# Patient Record
Sex: FEMALE | Race: WHITE | Employment: FULL TIME | URBAN - METROPOLITAN AREA
[De-identification: names, ages, dates, MRNs, and addresses within clinical notes are randomized per-mention and may not be internally consistent; named-entity substitution may affect disease eponyms.]

---

## 2018-08-03 ENCOUNTER — OFFICE VISIT (OUTPATIENT)
Dept: OTOLARYNGOLOGY | Facility: CLINIC | Age: 18
End: 2018-08-03
Payer: COMMERCIAL

## 2018-08-03 ENCOUNTER — OFFICE VISIT (OUTPATIENT)
Dept: AUDIOLOGY | Facility: CLINIC | Age: 18
End: 2018-08-03
Payer: COMMERCIAL

## 2018-08-03 VITALS
WEIGHT: 110 LBS | SYSTOLIC BLOOD PRESSURE: 140 MMHG | DIASTOLIC BLOOD PRESSURE: 98 MMHG | HEIGHT: 63 IN | RESPIRATION RATE: 12 BRPM | HEART RATE: 99 BPM | BODY MASS INDEX: 19.49 KG/M2 | OXYGEN SATURATION: 100 %

## 2018-08-03 DIAGNOSIS — H60.63 CHRONIC NON-INFECTIVE OTITIS EXTERNA OF BOTH EARS, UNSPECIFIED TYPE: Primary | ICD-10-CM

## 2018-08-03 DIAGNOSIS — H90.12 CONDUCTIVE HEARING LOSS OF LEFT EAR WITH UNRESTRICTED HEARING OF RIGHT EAR: Primary | ICD-10-CM

## 2018-08-03 DIAGNOSIS — H69.93 DISORDER OF BOTH EUSTACHIAN TUBES: ICD-10-CM

## 2018-08-03 PROCEDURE — 92567 TYMPANOMETRY: CPT | Performed by: AUDIOLOGIST

## 2018-08-03 PROCEDURE — 99204 OFFICE O/P NEW MOD 45 MIN: CPT | Performed by: OTOLARYNGOLOGY

## 2018-08-03 PROCEDURE — 99207 ZZC NO CHARGE LOS: CPT | Performed by: AUDIOLOGIST

## 2018-08-03 PROCEDURE — 92557 COMPREHENSIVE HEARING TEST: CPT | Performed by: AUDIOLOGIST

## 2018-08-03 RX ORDER — NEOMYCIN SULFATE, POLYMYXIN B SULFATE AND HYDROCORTISONE 10; 3.5; 1 MG/ML; MG/ML; [USP'U]/ML
4 SUSPENSION/ DROPS AURICULAR (OTIC) 2 TIMES DAILY
Qty: 9 ML | Refills: 11 | Status: SHIPPED | OUTPATIENT
Start: 2018-08-03 | End: 2018-08-24

## 2018-08-03 NOTE — MR AVS SNAPSHOT
After Visit Summary   8/3/2018    Brook Davison    MRN: [id number]           Patient Information     Date Of Birth          2000        Visit Information        Provider Department      8/3/2018 8:30 AM Faizan Zuluaga [facility]        Today's Diagnoses     Conductive hearing loss of left ear with unrestricted hearing of right ear    -  1    Disorder of both eustachian tubes           Follow-ups after your visit        Who to contact     If you have questions or need follow up information about today's clinic visit or your schedule please contact [facility] directly at [phone].  Normal or non-critical lab and imaging results will be communicated to you by MyChart, letter or phone within 4 business days after the clinic has received the results. If you do not hear from us within 7 days, please contact the clinic through MyChart or phone. If you have a critical or abnormal lab result, we will notify you by phone as soon as possible.  Submit refill requests through [employer] or call your pharmacy and they will forward the refill request to us. Please allow 3 business days for your refill to be completed.          Additional Information About Your Visit        Care EveryWhere ID     This is your Care EveryWhere ID. This could be used by other organizations to access your [facility] medical records  [id number]         Blood Pressure from Last 3 Encounters:   No data found for BP    Weight from Last 3 Encounters:   No data found for Wt              We Performed the Following     AUDIOGRAM/TYMPANOGRAM - INTERFACE     COMPREHENSIVE HEARING TEST     TYMPANOMETRY        Primary Care Provider Office Phone # Fax #    [facility] [phone]       [address] [facility] [address]        Equal Access to Services     PEPE EDOUARD AH: Brisa randallo Soomaali, waaxda luqadaha, qaybta kaalmaranda mendoza  wero montemayor ah. So [facility] [phone].    ATENCIÓN: Si habla celio, tiene a milian disposición servicios gratuitos de asistencia lingüística. Solo al [phone].    We comply with applicable federal civil rights laws and [place] laws. We do not discriminate on the basis of race, color, national origin, age, disability, sex, sexual orientation, or gender identity.            Thank you!     Thank you for choosing [facility]  for your care. Our goal is always to provide you with excellent care. Hearing back from our patients is one way we can continue to improve our services. Please take a few minutes to complete the written survey that you may receive in the mail after your visit with us. Thank you!             Your Updated Medication List - Protect others around you: Learn how to safely use, store and throw away your medicines at www.disposemymeds.org.      Notice  As of 8/3/2018  9:05 AM    You have not been prescribed any medications.

## 2018-08-03 NOTE — LETTER
"    8/3/2018         RE: Brook Davison  [address] [place] [address]        Dear Colleague,    Thank you for referring your patient, Brook Davison, to the [facility]. Please see a copy of my visit note below.    History of Present Illness - Brook Davison is a 18 year old female here to see me for the first time for ear issues.  She tells me that when she was a child she had chronic ear infections, and had at least one set of tubes.  After extrusion, through her older childhood, she decreased the frequency to about 3-4 times per year.    Then in the past year things seem to have ramped.  She will get an acute sharp pain, and then there will be drainage from the ears.  The RIGHT is much worse with \"horrible smelling drainage.\"  But interestingly the LEFT ear has more pain, but does not really drain.   Other than the tubes, no ENT surgeries.  She has not had any change in environment and no change in overall nasal health.  She does not have a lot of water exposure.  She avoids qtips, and she does use debrox at times.    She does have food allergies but does not see an allergist regularly.  She actually lives in [place], [place], but is here for college.    Past Medical History - There is no problem list on file for this patient.      Current Medications -   Current Outpatient Prescriptions:      neomycin-polymyxin-hydrocortisone (CORTISPORIN) 3.5-36740-5 otic suspension, Place 4 drops into both ears 2 times daily for 21 days, Disp: 9 mL, Rfl: 11    Allergies - No Known Allergies    Social History -   Social History     Social History     Marital status: Single     Spouse name: N/A     Number of children: N/A     Years of education: N/A     Social History Main Topics     Smoking status: Not on file     Smokeless tobacco: Not on file     Alcohol use Not on file     Drug use: Not on file     Sexual activity: Not on file     Other Topics Concern     Not on file     Social History Narrative " "      Family History - No family history on file.    Review of Systems - As per HPI and PMHx, otherwise 10+ system review of the head and neck, and general constitution is negative.    Physical Exam  BP (!) 140/98  Pulse 99  Resp 12  Ht 1.6 m (5' 3\")  Wt 49.9 kg (110 lb)  SpO2 100%  BMI 19.49 kg/m2    General - The patient is well nourished and well developed, and appears to have good nutritional status.  Alert and oriented to person and place, answers questions and cooperates with examination appropriately.   Head and Face - Normocephalic and atraumatic, with no gross asymmetry noted of the contour of the facial features.  The facial nerve is intact, with strong symmetric movements.  Voice and Breathing - The patient was breathing comfortably without the use of accessory muscles. There was no wheezing, stridor, or stertor.  The patients voice was clear and strong, and had appropriate pitch and quality.  Ears - The tympanic membranes are normal in appearance, bony landmarks are intact.  No retraction, perforation, or masses.  No fluid or purulence was seen in the external canal or the middle ear. No evidence of infection of the middle ear.  But bilaterally the canals are very moist, especially the RIGHT with purulent debris.  But the tympanic membrane's look healthy.  Eyes - Extraocular movements intact, and the pupils were reactive to light.  Sclera were not icteric or injected, conjunctiva were pink and moist.  Mouth - Examination of the oral cavity showed pink, healthy oral mucosa. No lesions or ulcerations noted.  The tongue was mobile and midline, and the dentition were in good condition.    Throat - The walls of the oropharynx were smooth, pink, moist, symmetric, and had no lesions or ulcerations.  The tonsillar pillars and soft palate were symmetric.  The uvula was midline on elevation.    Neck - Normal midline excursion of the laryngotracheal complex during swallowing.  Full range of motion on passive " movement.  Palpation of the occipital, submental, submandibular, internal jugular chain, and supraclavicular nodes did not demonstrate any abnormal lymph nodes or masses.  The carotid pulse was palpable bilaterally.  Palpation of the thyroid was soft and smooth, with no nodules or goiter appreciated.  The trachea was mobile and midline.  Nose - External contour is symmetric, no gross deflection or scars.  Nasal mucosa is pink and moist with no abnormal mucus.  The septum was midline and non-obstructive, turbinates of normal size and position.  No polyps, masses, or purulence noted on examination.    Audiologic Studies - An audiogram and tympanogram were performed today as part of the evaluation and personally reviewed. The tympanogram shows a normal Type A curve, with normal canal volume and middle ear pressure.  There is no sign of eustachian tube dysfunction or middle ear effusion.  The audiogram was also normal.  The sensorineural hearing was age-appropriate, with no evidence of conductive hearing loss or significant asymmetry.      A/P - Brook Davison is a 18 year old female  (H60.63) Chronic non-infective otitis externa of both ears, unspecified type  (primary encounter diagnosis)    I am happy to tell her that it is not recurrent tympanic membrane rupture, but chronic otitis externa that is the issues.  Based on the normal audio and tympanogram today, I do not think that eustachian tube dysfunction is the issue, and tubes are not indicated at this time.    To address the otitis externa, I will place her on cortisporin drops for 21 days.  And then after that, use as needed.  If that does not control it, then follow up with me.    Again, thank you for allowing me to participate in the care of your patient.        Sincerely,        Ricco Damon MD

## 2018-08-03 NOTE — PATIENT INSTRUCTIONS
Scheduling Information  To schedule your CT/MRI scan, please contact [facility] at [phone] OR [facility] at [phone]    To schedule your Surgery, please contact our Specialty Schedulers at [phone]      ENT Clinic Locations Clinic Hours Telephone Number     [facility]  [address] [place] [address]   Monday:           1:00pm -- 5:00pm    Friday:              8:00am - 12:00pm   To schedule/reschedule an appointment with   Dr. Damon,   please contact our   Specialty Scheduling Department at:     [phone]       [facility]  [address] [place] [address] Tuesday:          8:00am -- 2:00pm         Urgent Care Locations Clinic Hours Telephone Numbers     [facility]  [address] [place] [address]     Monday-Friday:     11:00am - 9:00pm    Saturday-Sunday:  9:00am - 5:00pm   [phone]     [facility]  [address] [place] [address]     Monday-Friday:      5:00pm - 9:00pm     Saturday-Sunday:  9:00am - 5:00pm   [phone]

## 2018-08-03 NOTE — PROGRESS NOTES
"History of Present Illness - Brook Davison is a 18 year old female here to see me for the first time for ear issues.  She tells me that when she was a child she had chronic ear infections, and had at least one set of tubes.  After extrusion, through her older childhood, she decreased the frequency to about 3-4 times per year.    Then in the past year things seem to have ramped.  She will get an acute sharp pain, and then there will be drainage from the ears.  The RIGHT is much worse with \"horrible smelling drainage.\"  But interestingly the LEFT ear has more pain, but does not really drain.   Other than the tubes, no ENT surgeries.  She has not had any change in environment and no change in overall nasal health.  She does not have a lot of water exposure.  She avoids qtips, and she does use debrox at times.    She does have food allergies but does not see an allergist regularly.  She actually lives in [place], [place], but is here for college.    Past Medical History - There is no problem list on file for this patient.      Current Medications -   Current Outpatient Prescriptions:      neomycin-polymyxin-hydrocortisone (CORTISPORIN) 3.5-93502-5 otic suspension, Place 4 drops into both ears 2 times daily for 21 days, Disp: 9 mL, Rfl: 11    Allergies - No Known Allergies    Social History -   Social History     Social History     Marital status: Single     Spouse name: N/A     Number of children: N/A     Years of education: N/A     Social History Main Topics     Smoking status: Not on file     Smokeless tobacco: Not on file     Alcohol use Not on file     Drug use: Not on file     Sexual activity: Not on file     Other Topics Concern     Not on file     Social History Narrative       Family History - No family history on file.    Review of Systems - As per HPI and PMHx, otherwise 10+ system review of the head and neck, and general constitution is negative.    Physical Exam  BP (!) 140/98  Pulse 99  Resp 12  Ht 1.6 m (5' " "3\")  Wt 49.9 kg (110 lb)  SpO2 100%  BMI 19.49 kg/m2    General - The patient is well nourished and well developed, and appears to have good nutritional status.  Alert and oriented to person and place, answers questions and cooperates with examination appropriately.   Head and Face - Normocephalic and atraumatic, with no gross asymmetry noted of the contour of the facial features.  The facial nerve is intact, with strong symmetric movements.  Voice and Breathing - The patient was breathing comfortably without the use of accessory muscles. There was no wheezing, stridor, or stertor.  The patients voice was clear and strong, and had appropriate pitch and quality.  Ears - The tympanic membranes are normal in appearance, bony landmarks are intact.  No retraction, perforation, or masses.  No fluid or purulence was seen in the external canal or the middle ear. No evidence of infection of the middle ear.  But bilaterally the canals are very moist, especially the RIGHT with purulent debris.  But the tympanic membrane's look healthy.  Eyes - Extraocular movements intact, and the pupils were reactive to light.  Sclera were not icteric or injected, conjunctiva were pink and moist.  Mouth - Examination of the oral cavity showed pink, healthy oral mucosa. No lesions or ulcerations noted.  The tongue was mobile and midline, and the dentition were in good condition.    Throat - The walls of the oropharynx were smooth, pink, moist, symmetric, and had no lesions or ulcerations.  The tonsillar pillars and soft palate were symmetric.  The uvula was midline on elevation.    Neck - Normal midline excursion of the laryngotracheal complex during swallowing.  Full range of motion on passive movement.  Palpation of the occipital, submental, submandibular, internal jugular chain, and supraclavicular nodes did not demonstrate any abnormal lymph nodes or masses.  The carotid pulse was palpable bilaterally.  Palpation of the thyroid was soft " and smooth, with no nodules or goiter appreciated.  The trachea was mobile and midline.  Nose - External contour is symmetric, no gross deflection or scars.  Nasal mucosa is pink and moist with no abnormal mucus.  The septum was midline and non-obstructive, turbinates of normal size and position.  No polyps, masses, or purulence noted on examination.    Audiologic Studies - An audiogram and tympanogram were performed today as part of the evaluation and personally reviewed. The tympanogram shows a normal Type A curve, with normal canal volume and middle ear pressure.  There is no sign of eustachian tube dysfunction or middle ear effusion.  The audiogram was also normal.  The sensorineural hearing was age-appropriate, with no evidence of conductive hearing loss or significant asymmetry.      A/P - Brook Davison is a 18 year old female  (H60.63) Chronic non-infective otitis externa of both ears, unspecified type  (primary encounter diagnosis)    I am happy to tell her that it is not recurrent tympanic membrane rupture, but chronic otitis externa that is the issues.  Based on the normal audio and tympanogram today, I do not think that eustachian tube dysfunction is the issue, and tubes are not indicated at this time.    To address the otitis externa, I will place her on cortisporin drops for 21 days.  And then after that, use as needed.  If that does not control it, then follow up with me.

## 2018-08-03 NOTE — MR AVS SNAPSHOT
After Visit Summary   8/3/2018    Brook Davison    MRN: [id number]           Patient Information     Date Of Birth          2000        Visit Information        Provider Department      8/3/2018 9:00 AM Ricco Damon MD [facility]        Today's Diagnoses     Chronic non-infective otitis externa of both ears, unspecified type    -  1      Care Instructions    Scheduling Information  To schedule your CT/MRI scan, please contact [facility] at [phone] OR [facility] at [phone]    To schedule your Surgery, please contact our Specialty Schedulers at [phone]      ENT Clinic Locations Clinic Hours Telephone Number     [facility]  [address]  [place] [address]   Monday:           1:00pm -- 5:00pm    Friday:              8:00am - 12:00pm   To schedule/reschedule an appointment with   Dr. Damon,   please contact our   Specialty Scheduling Department at:     [phone]       [facility]  [address] [place] [address] Tuesday:          8:00am -- 2:00pm         Urgent Care Locations Clinic Hours Telephone Numbers     [facility]  [address] [place] [address]     Monday-Friday:     11:00am - 9:00pm    Saturday-Sunday:  9:00am - 5:00pm   [phone]     [facility]  [address] [place] [address]     Monday-Friday:      5:00pm - 9:00pm     Saturday-Sunday:  9:00am - 5:00pm   [phone]                 Follow-ups after your visit        Who to contact     If you have questions or need follow up information about today's clinic visit or your schedule please contact [facility] directly at [phone].  Normal or non-critical lab and imaging results will be communicated to you by MyChart, letter or phone within 4 business days after the clinic has received the results. If you do not hear from us within 7 days, please contact the clinic through MyChart or phone. If  "you have a critical or abnormal lab result, we will notify you by phone as soon as possible.  Submit refill requests through [employer] or call your pharmacy and they will forward the refill request to us. Please allow 3 business days for your refill to be completed.          Additional Information About Your Visit        Care EveryWhere ID     This is your Care EveryWhere ID. This could be used by other organizations to access your [facility] medical records  [id number]        Your Vitals Were     Pulse Respirations Height Pulse Oximetry BMI (Body Mass Index)       99 12 1.6 m (5' 3\") 100% 19.49 kg/m2        Blood Pressure from Last 3 Encounters:   08/03/18 (!) 140/98    Weight from Last 3 Encounters:   08/03/18 49.9 kg (110 lb)              Today, you had the following     No orders found for display         Today's Medication Changes          These changes are accurate as of 8/3/18  9:48 AM.  If you have any questions, ask your nurse or doctor.               Start taking these medicines.        Dose/Directions    neomycin-polymyxin-hydrocortisone 3.5-33657-5 otic suspension   Commonly known as:  CORTISPORIN   Used for:  Chronic non-infective otitis externa of both ears, unspecified type   Started by:  Ricco Damon MD        Dose:  4 drop   Place 4 drops into both ears 2 times daily for 21 days   Quantity:  9 mL   Refills:  11            Where to get your medicines      These medications were sent to [id number] - [place], [place] - [address] AT [facility] [address]  [address], [place] [address]    Hours:  24-hours Phone:  [phone]     neomycin-polymyxin-hydrocortisone 3.5-54166-3 otic suspension                Primary Care Provider Office Phone # Fax #    [facility] [phone] [address] [facility] [address]        Equal Access to Services     PEPE EDOUARD AH: Brisa Barnett, waaxda luqadaha, qaybta " maranda brownmadelyn montemayor ah. Roseann [facility] [phone].    ATENCIÓN: Si keon pickens, tiene a milian disposición servicios gratuitos de asistencia lingüística. Solo al [phone].    We comply with applicable federal civil rights laws and [place] laws. We do not discriminate on the basis of race, color, national origin, age, disability, sex, sexual orientation, or gender identity.            Thank you!     Thank you for choosing [facility]  for your care. Our goal is always to provide you with excellent care. Hearing back from our patients is one way we can continue to improve our services. Please take a few minutes to complete the written survey that you may receive in the mail after your visit with us. Thank you!             Your Updated Medication List - Protect others around you: Learn how to safely use, store and throw away your medicines at www.disposemymeds.org.          This list is accurate as of 8/3/18  9:48 AM.  Always use your most recent med list.                   Brand Name Dispense Instructions for use Diagnosis    neomycin-polymyxin-hydrocortisone 3.5-87213-0 otic suspension    CORTISPORIN    9 mL    Place 4 drops into both ears 2 times daily for 21 days    Chronic non-infective otitis externa of both ears, unspecified type

## 2018-08-03 NOTE — PROGRESS NOTES
AUDIOLOGY REPORT:    Patient was referred to Audiology from ENT by Dr. Damon for a hearing examination. Patient reports a significant history of eustachian tube dysfunction including PE tubes bilaterally. Patient reports recent ear infections which have caused ruptures to both tympanic membranes. Patient reports that she currently has a perforation in the right ear tympanic membrane.     Testing:    Otoscopy:   Otoscopic exam indicates partial obstruction with cerumen bilaterally     Tympanograms:    RIGHT: normal eardrum mobility     LEFT:   normal eardrum mobility    Thresholds:   Pure Tone Thresholds assessed using conventional audiometry with good  reliability from 250-8000 Hz bilaterally using insert earphones     RIGHT:  normal hearing sensitivity for all frequencies tested.     LEFT:    normal hearing sensitivity for all frequencies tested with the exception of a mild conductive hearing loss at 4000 Hz only    Speech Reception Threshold:    RIGHT: 10 dB HL    LEFT:   10 dB HL    Word Recognition Score:     RIGHT: 100% at 50 dB HL using NU-6 recorded word list.    LEFT:   100% at 50 dB HL using NU-6 recorded word list.    Discussed results with the patient.     Patient was returned to ENT for follow up.     Faizan Lucio CCC-A  Licensed Audiologist  8/3/2018

## 2018-08-05 ENCOUNTER — TELEPHONE (OUTPATIENT)
Dept: OTOLARYNGOLOGY | Facility: CLINIC | Age: 18
End: 2018-08-05

## 2018-08-05 NOTE — TELEPHONE ENCOUNTER
Clinic Action Needed:  Yes, please contact the pharmcy at [phone]    Presenting Problem: Pharmacist calling to have clinic provider consult with them regarding pt's history of perforated ear drum and the medication sent over on 8/3/18.      Routed to:  OLGA Mariee RN/FNA

## 2018-08-06 NOTE — TELEPHONE ENCOUNTER
Spoke with pharmacist and clarified that pt's most recent exam did not show sign of a perforated TM. They had no further questions.    Manpreet Cisneros RN....8/6/2018 8:18 AM

## 2018-08-23 ENCOUNTER — OFFICE VISIT (OUTPATIENT)
Dept: OTOLARYNGOLOGY | Facility: CLINIC | Age: 18
End: 2018-08-23
Payer: COMMERCIAL

## 2018-08-23 VITALS
WEIGHT: 115 LBS | DIASTOLIC BLOOD PRESSURE: 79 MMHG | TEMPERATURE: 98.7 F | HEART RATE: 79 BPM | BODY MASS INDEX: 20.38 KG/M2 | SYSTOLIC BLOOD PRESSURE: 114 MMHG | HEIGHT: 63 IN

## 2018-08-23 DIAGNOSIS — H60.392 INFECTIVE OTITIS EXTERNA, LEFT: Primary | ICD-10-CM

## 2018-08-23 PROCEDURE — 99213 OFFICE O/P EST LOW 20 MIN: CPT | Performed by: OTOLARYNGOLOGY

## 2018-08-23 ASSESSMENT — PAIN SCALES - GENERAL: PAINLEVEL: NO PAIN (0)

## 2018-08-23 NOTE — MR AVS SNAPSHOT
"              After Visit Summary   8/23/2018    Brook Davison    MRN: [id number]           Patient Information     Date Of Birth          2000        Visit Information        Provider Department      8/23/2018 1:45 PM Khadar Ambriz MD [facility]        Today's Diagnoses     Infective otitis externa, left    -  1      Care Instructions    Per Physician's instructions            Follow-ups after your visit        Who to contact     If you have questions or need follow up information about today's clinic visit or your schedule please contact [facility] directly at [phone].  Normal or non-critical lab and imaging results will be communicated to you by MyChart, letter or phone within 4 business days after the clinic has received the results. If you do not hear from us within 7 days, please contact the clinic through MyChart or phone. If you have a critical or abnormal lab result, we will notify you by phone as soon as possible.  Submit refill requests through [employer] or call your pharmacy and they will forward the refill request to us. Please allow 3 business days for your refill to be completed.          Additional Information About Your Visit        Care EveryWhere ID     This is your Care EveryWhere ID. This could be used by other organizations to access your [facility] medical records  [id number]        Your Vitals Were     Pulse Temperature Height BMI (Body Mass Index)          79 98.7  F (37.1  C) (Oral) 1.6 m (5' 3\") 20.37 kg/m2         Blood Pressure from Last 3 Encounters:   08/23/18 114/79   08/03/18 (!) 140/98    Weight from Last 3 Encounters:   08/23/18 52.2 kg (115 lb)   08/03/18 49.9 kg (110 lb)              Today, you had the following     No orders found for display       Primary Care Provider Office Phone # Fax #    [facility] [phone]       [address]  [place] [address]        Equal Access to Services     PEPE EDOUARD AH: " Hadii cheryle perez Soraniali, waaxda luqadaha, qaybta kaalwallace thomas, maranda gaylain hayaabandar tranmadelyn conteh layarelibandar je. So [facility] [phone].    ATENCIÓN: Si habla celio, tiene a milian disposición servicios gratuitos de asistencia lingüística. Llame al [phone].    We comply with applicable federal civil rights laws and [place] laws. We do not discriminate on the basis of race, color, national origin, age, disability, sex, sexual orientation, or gender identity.            Thank you!     Thank you for choosing [facility]  for your care. Our goal is always to provide you with excellent care. Hearing back from our patients is one way we can continue to improve our services. Please take a few minutes to complete the written survey that you may receive in the mail after your visit with us. Thank you!             Your Updated Medication List - Protect others around you: Learn how to safely use, store and throw away your medicines at www.disposemymeds.org.          This list is accurate as of 8/23/18 11:59 PM.  Always use your most recent med list.                   Brand Name Dispense Instructions for use Diagnosis    neomycin-polymyxin-hydrocortisone 3.5-67956-9 otic suspension    CORTISPORIN    9 mL    Place 4 drops into both ears 2 times daily for 21 days    Chronic non-infective otitis externa of both ears, unspecified type

## 2018-08-23 NOTE — NURSING NOTE
"Initial /79 (BP Location: Right arm, Patient Position: Sitting, Cuff Size: Adult Regular)  Pulse 79  Temp 98.7  F (37.1  C) (Oral)  Ht 1.6 m (5' 3\")  Wt 52.2 kg (115 lb)  BMI 20.37 kg/m2 Estimated body mass index is 20.37 kg/(m^2) as calculated from the following:    Height as of this encounter: 1.6 m (5' 3\").    Weight as of this encounter: 52.2 kg (115 lb). .    Flora Lino CMA    "

## 2018-08-23 NOTE — PROGRESS NOTES
"History of Present Illness - Brook Davison is a 18 year old female who recently saw Dr Damon for chronic otitis externa bilaterally. She was treated with cortisporin. She still thinks her ears are infected. Audiogram last time was normal.  She feels her left ear is still a bit full. She is flying tomorrow, and wanted to be sure everything healed well.    Past Medical History -   Bilateral myringotomy tubes in childhood    Current Medications -   Current Outpatient Prescriptions:      neomycin-polymyxin-hydrocortisone (CORTISPORIN) 3.5-75148-1 otic suspension, Place 4 drops into both ears 2 times daily for 21 days (Patient not taking: Reported on 8/23/2018), Disp: 9 mL, Rfl: 11    Allergies -   Allergies   Allergen Reactions     Amoxicillin GI Disturbance     headaches       Social History -   Social History     Social History     Marital status: Single     Spouse name: N/A     Number of children: N/A     Years of education: N/A     Social History Main Topics     Smoking status: Never Smoker     Smokeless tobacco: Never Used     Alcohol use Not on file     Drug use: Not on file     Sexual activity: Not on file     Other Topics Concern     Not on file     Social History Narrative     No narrative on file       Family History - History reviewed. No pertinent family history.    Review of Systems - As per HPI and PMHx, otherwise 7 system review of the head and neck negative. 10+ system review negative.    Exam:  /79 (BP Location: Right arm, Patient Position: Sitting, Cuff Size: Adult Regular)  Pulse 79  Temp 98.7  F (37.1  C) (Oral)  Ht 1.6 m (5' 3\")  Wt 52.2 kg (115 lb)  BMI 20.37 kg/m2  General - The patient is well nourished and well developed, and appears to have good nutritional status.  Alert and oriented to person and place, answers questions and cooperates with examination appropriately.   Head and Face - Normocephalic and atraumatic, with no gross asymmetry noted of the contour of the facial " features.  The facial nerve is intact, with strong symmetric movements.  Eyes - Extraocular movements intact.  Sclera were not icteric or injected, conjunctiva were pink and moist.  Ears - bilateral ear canals with resolution of otorrhea. There was some precipitate on the left TM, but she could not tolerate removal of this today.       A/P - Brook Davison is a 18 year old female with resolution of otitis externa. I reassured her that the treatment seems to have worked. There is no perforation of the TM, and she is perfectly fine to fly as planned.      Dr. Khadar Ambriz MD  Otolaryngology  [facility]

## 2018-08-23 NOTE — LETTER
"    8/23/2018         RE: Brook Davison  [address] [place] [address]        Dear Colleague,    Thank you for referring your patient, Brook Davison, to the [facility]. Please see a copy of my visit note below.    History of Present Illness - Brook Davison is a 18 year old female who recently saw Dr Damon for chronic otitis externa bilaterally. She was treated with cortisporin. She still thinks her ears are infected. Audiogram last time was normal.  She feels her left ear is still a bit full. She is flying tomorrow, and wanted to be sure everything healed well.    Past Medical History -   Bilateral myringotomy tubes in childhood    Current Medications -   Current Outpatient Prescriptions:      neomycin-polymyxin-hydrocortisone (CORTISPORIN) 3.5-06414-1 otic suspension, Place 4 drops into both ears 2 times daily for 21 days (Patient not taking: Reported on 8/23/2018), Disp: 9 mL, Rfl: 11    Allergies -   Allergies   Allergen Reactions     Amoxicillin GI Disturbance     headaches       Social History -   Social History     Social History     Marital status: Single     Spouse name: N/A     Number of children: N/A     Years of education: N/A     Social History Main Topics     Smoking status: Never Smoker     Smokeless tobacco: Never Used     Alcohol use Not on file     Drug use: Not on file     Sexual activity: Not on file     Other Topics Concern     Not on file     Social History Narrative     No narrative on file       Family History - History reviewed. No pertinent family history.    Review of Systems - As per HPI and PMHx, otherwise 7 system review of the head and neck negative. 10+ system review negative.    Exam:  /79 (BP Location: Right arm, Patient Position: Sitting, Cuff Size: Adult Regular)  Pulse 79  Temp 98.7  F (37.1  C) (Oral)  Ht 1.6 m (5' 3\")  Wt 52.2 kg (115 lb)  BMI 20.37 kg/m2  General - The patient is well nourished and well developed, and appears to have good " nutritional status.  Alert and oriented to person and place, answers questions and cooperates with examination appropriately.   Head and Face - Normocephalic and atraumatic, with no gross asymmetry noted of the contour of the facial features.  The facial nerve is intact, with strong symmetric movements.  Eyes - Extraocular movements intact.  Sclera were not icteric or injected, conjunctiva were pink and moist.  Ears - bilateral ear canals with resolution of otorrhea. There was some precipitate on the left TM, but she could not tolerate removal of this today.       A/P - Brook Davison is a 18 year old female with resolution of otitis externa. I reassured her that the treatment seems to have worked. There is no perforation of the TM, and she is perfectly fine to fly as planned.      Dr. Khadar Ambriz MD  Otolaryngology  [facility]      Again, thank you for allowing me to participate in the care of your patient.        Sincerely,        Khadar Ambriz MD

## 2020-08-02 ENCOUNTER — HOSPITAL ENCOUNTER (EMERGENCY)
Facility: CLINIC | Age: 20
Discharge: HOME | End: 2020-08-02
Attending: INTERNAL MEDICINE | Admitting: INTERNAL MEDICINE
Payer: COMMERCIAL

## 2020-08-02 VITALS
TEMPERATURE: 98.1 F | RESPIRATION RATE: 18 BRPM | BODY MASS INDEX: 22.34 KG/M2 | SYSTOLIC BLOOD PRESSURE: 131 MMHG | DIASTOLIC BLOOD PRESSURE: 97 MMHG | HEART RATE: 98 BPM | HEIGHT: 62 IN | OXYGEN SATURATION: 98 % | WEIGHT: 121.4 LBS

## 2020-08-02 DIAGNOSIS — S61.211A LACERATION OF LEFT INDEX FINGER WITHOUT FOREIGN BODY WITHOUT DAMAGE TO NAIL, INITIAL ENCOUNTER: ICD-10-CM

## 2020-08-02 PROCEDURE — 12001 RPR S/N/AX/GEN/TRNK 2.5CM/<: CPT | Mod: Z6 | Performed by: INTERNAL MEDICINE

## 2020-08-02 PROCEDURE — 99282 EMERGENCY DEPT VISIT SF MDM: CPT | Performed by: INTERNAL MEDICINE

## 2020-08-02 PROCEDURE — 12001 RPR S/N/AX/GEN/TRNK 2.5CM/<: CPT | Performed by: INTERNAL MEDICINE

## 2020-08-02 PROCEDURE — 99282 EMERGENCY DEPT VISIT SF MDM: CPT | Mod: 25 | Performed by: INTERNAL MEDICINE

## 2020-08-02 RX ORDER — BUPIVACAINE HYDROCHLORIDE 5 MG/ML
5 INJECTION, SOLUTION EPIDURAL; INTRACAUDAL ONCE
Status: DISCONTINUED | OUTPATIENT
Start: 2020-08-02 | End: 2020-08-03 | Stop reason: HOSPADM

## 2020-08-02 SDOH — HEALTH STABILITY: MENTAL HEALTH: HOW OFTEN DO YOU HAVE A DRINK CONTAINING ALCOHOL?: NEVER

## 2020-08-02 ASSESSMENT — MIFFLIN-ST. JEOR: SCORE: 1263.92

## 2020-08-02 ASSESSMENT — ENCOUNTER SYMPTOMS
WEAKNESS: 0
NUMBNESS: 0
WOUND: 1
FEVER: 0
SHORTNESS OF BREATH: 0
ABDOMINAL PAIN: 0

## 2020-08-02 NOTE — ED AVS SNAPSHOT
[facility], [place], Emergency Department  [address] [place] [id number]  Phone:  [phone]                                    Brook Davison   MRN: [id number]    Department:  [facility], Emergency Department   Date of Visit:  8/2/2020           After Visit Summary Signature Page    I have received my discharge instructions, and my questions have been answered. I have discussed any challenges I see with this plan with the nurse or doctor.    ..........................................................................................................................................  Patient/Patient Representative Signature      ..........................................................................................................................................  Patient Representative Print Name and Relationship to Patient    ..................................................               ................................................  Date                                   Time    ..........................................................................................................................................  Reviewed by Signature/Title    ...................................................              ..............................................  Date                                               Time          22EPIC Rev 08/18

## 2020-08-03 NOTE — ED TRIAGE NOTES
Pt was cleaning a knife about 60 mins ago and cut her left index finger. Small, deep lac noted on posterior of finger. VSS on RA.

## 2020-08-03 NOTE — DISCHARGE INSTRUCTIONS
Keep the wound clean and dry.  Have the sutures removed in 7-8 days at your regular physical or health service.  Return for any signs of infection such as redness, swelling or pus formation. Return if you have any difficulty moving the finger.

## 2020-08-03 NOTE — ED PROVIDER NOTES
"ED Provider Note  [facility]      History     Chief Complaint   Patient presents with     Laceration     HPI  Brook Davison is a 20 year old female who cut her left index finger while washing a knife at home. She denies numbness or weakness. Injury occurred about 30 minutes before arrival. She denies other injuries. She is a college student at [employer].    Past Medical History  History reviewed. No pertinent past medical history.  History reviewed. No pertinent surgical history.  No current outpatient medications on file.    Allergies   Allergen Reactions     Soy Allergy Anaphylaxis     Tree Nuts [Nuts] Anaphylaxis     Amoxicillin GI Disturbance     headaches     Coconut Oil      Past medical history, past surgical history, medications, and allergies were reviewed with the patient. Additional pertinent items: None    Family History  History reviewed. No pertinent family history.  Family history was reviewed with the patient. Additional pertinent items: None    Social History  Social History     Tobacco Use     Smoking status: Never Smoker     Smokeless tobacco: Never Used   Substance Use Topics     Alcohol use: Never     Frequency: Never     Drug use: Yes     Types: Marijuana     Comment: 1-2 times per week      Social history was reviewed with the patient. Additional pertinent items: None    Review of Systems   Constitutional: Negative for fever.   Respiratory: Negative for shortness of breath.    Cardiovascular: Negative for chest pain.   Gastrointestinal: Negative for abdominal pain.   Skin: Positive for wound.   Neurological: Negative for weakness and numbness.   All other systems reviewed and are negative.        Physical Exam   BP: (!) 134/97  Heart Rate: 100  Temp: 98.1  F (36.7  C)  Resp: 18  Height: 157.5 cm (5' 2\")  Weight: 55.1 kg (121 lb 6.4 oz)  SpO2: 97 %  Physical Exam  Vitals signs and nursing note reviewed.   Constitutional:       Appearance: Normal appearance. "   HENT:      Right Ear: External ear normal.      Left Ear: External ear normal.      Nose: Nose normal.   Eyes:      Pupils: Pupils are equal, round, and reactive to light.   Cardiovascular:      Rate and Rhythm: Normal rate.   Pulmonary:      Effort: Pulmonary effort is normal.   Musculoskeletal:      Left hand: She exhibits tenderness and laceration. She exhibits normal range of motion and normal two-point discrimination. Normal sensation noted. Normal strength noted.        Hands:       Comments: Full AROM flexion and extension at MCP, PIP, DIP.   Skin:     General: Skin is warm and dry.   Neurological:      General: No focal deficit present.      Mental Status: She is alert.   Psychiatric:         Mood and Affect: Mood normal.         Behavior: Behavior normal.         ED Course      Procedures        [facility] Procedure Note        Laceration Repair:    Performed by: KAREN HAILE MD  Authorized by: KAREN HAILE MD  Consent given by: Patient who states understanding of the procedure being performed after discussing the risks, benefits and alternatives.    Preparation: Patient was prepped and draped in usual sterile fashion.  Irrigation solution: saline    Body area:left index finger  Laceration length: 1cm  Contamination: The wound is not contaminated.  Foreign bodies:none  Tendon involvement: none  Anesthesia: Local  Local anesthetic: Bupivacaine 0.5%  Anesthetic total: 2ml    Debridement: none  Skin closure: Closed with 3 x 5.0 Ethilon  Technique: interrupted  Approximation: close  Approximation difficulty: simple    Patient tolerance: Patient tolerated the procedure well with no immediate complications.    Medications   bupivacaine (MARCAINE) 0.5% preservative free injection (has no administration in time range)        Assessments & Plan (with Medical Decision Making)   Impression:  Young woman presets with superficial transverse laceration of the dorsal proximal segment of the left index  finger sustained from a clean knife at home. CMS is intact in the finger. The wound was irrigated, then closed with 5-0 nylon and dressed with bacitracin and gauze.    I have reviewed the nursing notes. I have reviewed the findings, diagnosis, plan and need for follow up with the patient.    New Prescriptions    No medications on file       Final diagnoses:   Laceration of left index finger without foreign body without damage to nail, initial encounter       --  KAREN HAILE MD   Emergency Medicine   [facility], [place], EMERGENCY DEPARTMENT  8/2/2020     Karen Haile MD  08/02/20 [id number]

## 2021-01-03 ENCOUNTER — HEALTH MAINTENANCE LETTER (OUTPATIENT)
Age: 21
End: 2021-01-03

## 2021-10-10 ENCOUNTER — HEALTH MAINTENANCE LETTER (OUTPATIENT)
Age: 21
End: 2021-10-10

## 2022-01-29 ENCOUNTER — HEALTH MAINTENANCE LETTER (OUTPATIENT)
Age: 22
End: 2022-01-29

## 2022-03-14 ENCOUNTER — APPOINTMENT (OUTPATIENT)
Dept: CT IMAGING | Facility: CLINIC | Age: 22
End: 2022-03-14
Attending: EMERGENCY MEDICINE
Payer: COMMERCIAL

## 2022-03-14 ENCOUNTER — HOSPITAL ENCOUNTER (EMERGENCY)
Facility: CLINIC | Age: 22
Discharge: HOME | End: 2022-03-14
Attending: EMERGENCY MEDICINE | Admitting: EMERGENCY MEDICINE
Payer: COMMERCIAL

## 2022-03-14 VITALS
TEMPERATURE: 98.5 F | HEIGHT: 63 IN | DIASTOLIC BLOOD PRESSURE: 81 MMHG | HEART RATE: 89 BPM | RESPIRATION RATE: 18 BRPM | OXYGEN SATURATION: 99 % | SYSTOLIC BLOOD PRESSURE: 121 MMHG | WEIGHT: 130.95 LBS | BODY MASS INDEX: 23.2 KG/M2

## 2022-03-14 DIAGNOSIS — R10.9 FLANK PAIN: ICD-10-CM

## 2022-03-14 LAB
ALBUMIN SERPL-MCNC: 4 G/DL (ref 3.4–5)
ALBUMIN UR-MCNC: NEGATIVE MG/DL
ALP SERPL-CCNC: 49 U/L (ref 40–150)
ALT SERPL W P-5'-P-CCNC: 42 U/L (ref 0–50)
ANION GAP SERPL CALCULATED.3IONS-SCNC: 5 MMOL/L (ref 3–14)
APPEARANCE UR: CLEAR
AST SERPL W P-5'-P-CCNC: 18 U/L (ref 0–45)
BASOPHILS # BLD AUTO: 0 10E3/UL (ref 0–0.2)
BASOPHILS NFR BLD AUTO: 1 %
BILIRUB SERPL-MCNC: 0.5 MG/DL (ref 0.2–1.3)
BILIRUB UR QL STRIP: NEGATIVE
BUN SERPL-MCNC: 8 MG/DL (ref 7–30)
CALCIUM SERPL-MCNC: 9.2 MG/DL (ref 8.5–10.1)
CHLORIDE BLD-SCNC: 107 MMOL/L (ref 94–109)
CO2 SERPL-SCNC: 29 MMOL/L (ref 20–32)
COLOR UR AUTO: ABNORMAL
CREAT SERPL-MCNC: 0.62 MG/DL (ref 0.52–1.04)
EOSINOPHIL # BLD AUTO: 0.1 10E3/UL (ref 0–0.7)
EOSINOPHIL NFR BLD AUTO: 1 %
ERYTHROCYTE [DISTWIDTH] IN BLOOD BY AUTOMATED COUNT: 12.4 % (ref 10–15)
GFR SERPL CREATININE-BSD FRML MDRD: >90 ML/MIN/1.73M2
GLUCOSE BLD-MCNC: 80 MG/DL (ref 70–99)
GLUCOSE UR STRIP-MCNC: NEGATIVE MG/DL
HCG UR QL: NEGATIVE
HCT VFR BLD AUTO: 41.1 % (ref 35–47)
HGB BLD-MCNC: 13.1 G/DL (ref 11.7–15.7)
HGB UR QL STRIP: ABNORMAL
HOLD SPECIMEN: NORMAL
IMM GRANULOCYTES # BLD: 0 10E3/UL
IMM GRANULOCYTES NFR BLD: 0 %
KETONES UR STRIP-MCNC: NEGATIVE MG/DL
LEUKOCYTE ESTERASE UR QL STRIP: NEGATIVE
LYMPHOCYTES # BLD AUTO: 1.3 10E3/UL (ref 0.8–5.3)
LYMPHOCYTES NFR BLD AUTO: 24 %
MCH RBC QN AUTO: 29.2 PG (ref 26.5–33)
MCHC RBC AUTO-ENTMCNC: 31.9 G/DL (ref 31.5–36.5)
MCV RBC AUTO: 92 FL (ref 78–100)
MONOCYTES # BLD AUTO: 0.5 10E3/UL (ref 0–1.3)
MONOCYTES NFR BLD AUTO: 9 %
MUCOUS THREADS #/AREA URNS LPF: PRESENT /LPF
NEUTROPHILS # BLD AUTO: 3.5 10E3/UL (ref 1.6–8.3)
NEUTROPHILS NFR BLD AUTO: 65 %
NITRATE UR QL: NEGATIVE
NRBC # BLD AUTO: 0 10E3/UL
NRBC BLD AUTO-RTO: 0 /100
PH UR STRIP: 7 [PH] (ref 5–7)
PLATELET # BLD AUTO: 231 10E3/UL (ref 150–450)
POTASSIUM BLD-SCNC: 3.4 MMOL/L (ref 3.4–5.3)
PROT SERPL-MCNC: 7.5 G/DL (ref 6.8–8.8)
RBC # BLD AUTO: 4.49 10E6/UL (ref 3.8–5.2)
RBC URINE: 3 /HPF
SODIUM SERPL-SCNC: 141 MMOL/L (ref 133–144)
SP GR UR STRIP: 1.01 (ref 1–1.03)
SQUAMOUS EPITHELIAL: 5 /HPF
TRANSITIONAL EPI: <1 /HPF
UROBILINOGEN UR STRIP-MCNC: NORMAL MG/DL
WBC # BLD AUTO: 5.4 10E3/UL (ref 4–11)
WBC URINE: 1 /HPF

## 2022-03-14 PROCEDURE — 99284 EMERGENCY DEPT VISIT MOD MDM: CPT | Mod: 25 | Performed by: EMERGENCY MEDICINE

## 2022-03-14 PROCEDURE — 258N000003 HC RX IP 258 OP 636: Performed by: EMERGENCY MEDICINE

## 2022-03-14 PROCEDURE — 81003 URINALYSIS AUTO W/O SCOPE: CPT | Performed by: EMERGENCY MEDICINE

## 2022-03-14 PROCEDURE — 74176 CT ABD & PELVIS W/O CONTRAST: CPT

## 2022-03-14 PROCEDURE — 36415 COLL VENOUS BLD VENIPUNCTURE: CPT | Performed by: EMERGENCY MEDICINE

## 2022-03-14 PROCEDURE — 81025 URINE PREGNANCY TEST: CPT | Performed by: EMERGENCY MEDICINE

## 2022-03-14 PROCEDURE — 85025 COMPLETE CBC W/AUTO DIFF WBC: CPT | Performed by: EMERGENCY MEDICINE

## 2022-03-14 PROCEDURE — 87086 URINE CULTURE/COLONY COUNT: CPT | Performed by: EMERGENCY MEDICINE

## 2022-03-14 PROCEDURE — 74176 CT ABD & PELVIS W/O CONTRAST: CPT | Mod: 26 | Performed by: STUDENT IN AN ORGANIZED HEALTH CARE EDUCATION/TRAINING PROGRAM

## 2022-03-14 PROCEDURE — 96360 HYDRATION IV INFUSION INIT: CPT | Performed by: EMERGENCY MEDICINE

## 2022-03-14 PROCEDURE — 99284 EMERGENCY DEPT VISIT MOD MDM: CPT | Performed by: EMERGENCY MEDICINE

## 2022-03-14 PROCEDURE — 80053 COMPREHEN METABOLIC PANEL: CPT | Performed by: EMERGENCY MEDICINE

## 2022-03-14 RX ORDER — HYDROMORPHONE HYDROCHLORIDE 1 MG/ML
0.5 INJECTION, SOLUTION INTRAMUSCULAR; INTRAVENOUS; SUBCUTANEOUS
Status: DISCONTINUED | OUTPATIENT
Start: 2022-03-14 | End: 2022-03-14 | Stop reason: HOSPADM

## 2022-03-14 RX ORDER — ONDANSETRON 2 MG/ML
4 INJECTION INTRAMUSCULAR; INTRAVENOUS EVERY 30 MIN PRN
Status: DISCONTINUED | OUTPATIENT
Start: 2022-03-14 | End: 2022-03-14 | Stop reason: HOSPADM

## 2022-03-14 RX ORDER — IBUPROFEN 600 MG/1
600 TABLET, FILM COATED ORAL ONCE
Status: DISCONTINUED | OUTPATIENT
Start: 2022-03-14 | End: 2022-03-14 | Stop reason: HOSPADM

## 2022-03-14 RX ORDER — SODIUM CHLORIDE 9 MG/ML
INJECTION, SOLUTION INTRAVENOUS CONTINUOUS
Status: DISCONTINUED | OUTPATIENT
Start: 2022-03-14 | End: 2022-03-14 | Stop reason: HOSPADM

## 2022-03-14 RX ADMIN — SODIUM CHLORIDE 1000 ML: 9 INJECTION, SOLUTION INTRAVENOUS at 09:08

## 2022-03-14 ASSESSMENT — ENCOUNTER SYMPTOMS
DIARRHEA: 0
BACK PAIN: 1
FREQUENCY: 0
SHORTNESS OF BREATH: 0
CONSTIPATION: 0
NAUSEA: 1
FLANK PAIN: 1
COUGH: 0
HEMATURIA: 0
ABDOMINAL PAIN: 1
VOMITING: 1
FEVER: 1
DYSURIA: 0

## 2022-03-14 NOTE — ED PROVIDER NOTES
[place] EMERGENCY DEPARTMENT ([facility])  3/14/22      History     Chief Complaint   Patient presents with     Abdominal Pain     The history is provided by the patient and medical records.     Brook Davison is a 21 year old female with a past medical history significant for kidney stones who presents to the ED for evaluation of RLQ abdominal pain radiating to the mid back.  Patient states that she has a history of kidney stones twice when she lived in [place].  She did not get imaging for these, she spoke to a doctor and they passed on their own.  She states that Thursday night 3/10/2022, she began to have stomach cramping, vomiting, fevers, and chills.  She states that the fevers lasted till she woke up Friday morning 3/11/2022 at 10am.  Beginning Friday morning she started to feel pain in her abdomen radiating to her right flank.  She states she had thought that she pulled a muscle from vomiting but it has been getting progressively worse.  She states that the abdominal pain began in the right upper quadrant but now presents with right lower quadrant pain.  She rates this pain as a 6/10.  Denies dysuria, frequency, urgency, or hematuria.  Denies abnormal vaginal discharge or changes in her menstrual period.  Denies chance of pregnancy.  Denies current fever.  Denies diarrhea or constipation.  She states that getting up from laying down increases the pain.  States that she has had herniated disc surgery on L5-S1. This back pain feels different than pain associated with her herniated disc.     Past Medical History  No past medical history on file.  No past surgical history on file.  No current outpatient medications on file.    Allergies   Allergen Reactions     Soy Allergy Anaphylaxis     Tree Nuts [Nuts] Anaphylaxis     Amoxicillin GI Disturbance     headaches     Coconut Oil      Family History  No family history on file.  Social History   Social History     Tobacco Use     Smoking status: Never  "Smoker     Smokeless tobacco: Never Used   Substance Use Topics     Alcohol use: Never     Drug use: Yes     Types: Marijuana     Comment: 1-2 times per week      Past medical history, past surgical history, medications, allergies, family history, and social history were reviewed with the patient. No additional pertinent items.       Review of Systems   Constitutional: Positive for fever.   Respiratory: Negative for cough and shortness of breath.    Gastrointestinal: Positive for abdominal pain, nausea and vomiting. Negative for constipation and diarrhea.   Genitourinary: Positive for flank pain. Negative for dysuria, frequency, hematuria, menstrual problem, urgency and vaginal discharge.   Musculoskeletal: Positive for back pain.   All other systems reviewed and are negative.    A complete review of systems was performed with pertinent positives and negatives noted in the HPI, and all other systems negative.    Physical Exam   BP: 130/79  Pulse: 96  Temp: 98  F (36.7  C)  Resp: 18  Height: 160 cm (5' 3\")  Weight: 59.4 kg (130 lb 15.3 oz)  SpO2: 98 %  Physical Exam  Vitals and nursing note reviewed.   Constitutional:       General: She is not in acute distress.     Appearance: Normal appearance. She is well-developed and normal weight. She is not ill-appearing or diaphoretic.   HENT:      Head: Normocephalic and atraumatic.      Nose: Nose normal.   Eyes:      General: No scleral icterus.     Conjunctiva/sclera: Conjunctivae normal.   Cardiovascular:      Rate and Rhythm: Normal rate.   Pulmonary:      Effort: Pulmonary effort is normal. No respiratory distress.      Breath sounds: No stridor.   Abdominal:      General: There is no distension.      Palpations: Abdomen is soft. There is no mass.      Tenderness: There is no abdominal tenderness. There is right CVA tenderness. There is no left CVA tenderness, guarding or rebound.   Musculoskeletal:         General: No deformity or signs of injury. Normal range of " motion.      Cervical back: Normal range of motion and neck supple. No rigidity.   Skin:     General: Skin is warm and dry.      Coloration: Skin is not jaundiced or pale.   Neurological:      General: No focal deficit present.      Mental Status: She is alert and oriented to person, place, and time.   Psychiatric:         Mood and Affect: Mood normal.         Behavior: Behavior normal.         ED Course     9:00 AM  The patient was seen and examined by Janee Jacinto MD in Room ED04.     Procedures                     Results for orders placed or performed during the hospital encounter of 03/14/22   CT Abdomen Pelvis w/o Contrast     Status: None    Narrative    EXAMINATION: CT ABDOMEN PELVIS W/O CONTRAST, 3/14/2022 10:28 AM    INDICATION: Flank pain, kidney stone suspected.  Per chart, RLQ  abdominal pain radiating to the mid back. ?    COMPARISON STUDY: None    TECHNIQUE: CT scan of the abdomen and pelvis was performed on  multidetector CT scanner using volumetric acquisition technique and  images were reconstructed in multiple planes with variable thickness  and reviewed on dedicated workstations.     CT scan radiation dose is optimized to minimum requisite dose using  automated dose modulation techniques.    FINDINGS:    Lower thorax: Normal.    Liver: No mass. No intrahepatic biliary ductal dilation.    Biliary System: Normal gallbladder. No extrahepatic biliary ductal  dilation.    Spleen: Normal. Incidental splenules.    Pancreas: No mass or pancreatic ductal dilation.    Adrenal glands: No mass or nodules    Kidneys: No suspicious mass, obstructing calculus or hydronephrosis.    Gastrointestinal tract:Normal appendix. Normal caliber small bowel.    Retroperitoneum: Patent major abdominal vasculature.    No significant  lymphadenopathy.    Pelvis: Urinary bladder is unremarkable.   Uterus and adnexa within  normal limits. Trace free fluid in the pelvis is nonspecific, and  likely physiologic.    Osseous  structures: No aggressive or acute osseous lesion.      Soft tissues: Within normal limits.      Impression    IMPRESSION:  No acute or suspicious abnormalities in the abdomen or  pelvis. Specifically, no renal calculi or obstructive uropathy seen.    I have personally reviewed the examination and initial interpretation  and I agree with the findings.    SAPPHIRE PADILLA MD         SYSTEM ID:  [id number]   Comprehensive metabolic panel     Status: Normal   Result Value Ref Range    Sodium 141 133 - 144 mmol/L    Potassium 3.4 3.4 - 5.3 mmol/L    Chloride 107 94 - 109 mmol/L    Carbon Dioxide (CO2) 29 20 - 32 mmol/L    Anion Gap 5 3 - 14 mmol/L    Urea Nitrogen 8 7 - 30 mg/dL    Creatinine 0.62 0.52 - 1.04 mg/dL    Calcium 9.2 8.5 - 10.1 mg/dL    Glucose 80 70 - 99 mg/dL    Alkaline Phosphatase 49 40 - 150 U/L    AST 18 0 - 45 U/L    ALT 42 0 - 50 U/L    Protein Total 7.5 6.8 - 8.8 g/dL    Albumin 4.0 3.4 - 5.0 g/dL    Bilirubin Total 0.5 0.2 - 1.3 mg/dL    GFR Estimate >90 >60 mL/min/1.73m2   UA with Microscopic reflex to Culture     Status: Abnormal    Specimen: Urine, Clean Catch   Result Value Ref Range    Color Urine Light Yellow Colorless, Straw, Light Yellow, Yellow    Appearance Urine Clear Clear    Glucose Urine Negative Negative mg/dL    Bilirubin Urine Negative Negative    Ketones Urine Negative Negative mg/dL    Specific Gravity Urine 1.014 1.003 - 1.035    Blood Urine Trace (A) Negative    pH Urine 7.0 5.0 - 7.0    Protein Albumin Urine Negative Negative mg/dL    Urobilinogen Urine Normal Normal, 2.0 mg/dL    Nitrite Urine Negative Negative    Leukocyte Esterase Urine Negative Negative    Mucus Urine Present (A) None Seen /LPF    RBC Urine 3 (H) <=2 /HPF    WBC Urine 1 <=5 /HPF    Squamous Epithelials Urine 5 (H) <=1 /HPF    Transitional Epithelials Urine <1 <=1 /HPF    Narrative    Urine Culture not indicated   HCG qualitative urine (UPT)     Status: Normal   Result Value Ref Range    hCG Urine Qualitative  Negative Negative   [place] Draw     Status: None    Narrative    The following orders were created for panel order [place] Draw.  Procedure                               Abnormality         Status                     ---------                               -----------         ------                     Extra Blue Top Tube[[id number]]                              Final result               Extra Red Top Tube[[id number]]                               Final result               Extra Green Top (Lithium...[[id number]]                      Final result               Extra Purple Top Tube[[id number]]                            Final result                 Please view results for these tests on the individual orders.   CBC with platelets and differential     Status: None   Result Value Ref Range    WBC Count 5.4 4.0 - 11.0 10e3/uL    RBC Count 4.49 3.80 - 5.20 10e6/uL    Hemoglobin 13.1 11.7 - 15.7 g/dL    Hematocrit 41.1 35.0 - 47.0 %    MCV 92 78 - 100 fL    MCH 29.2 26.5 - 33.0 pg    MCHC 31.9 31.5 - 36.5 g/dL    RDW 12.4 10.0 - 15.0 %    Platelet Count 231 150 - 450 10e3/uL    % Neutrophils 65 %    % Lymphocytes 24 %    % Monocytes 9 %    % Eosinophils 1 %    % Basophils 1 %    % Immature Granulocytes 0 %    NRBCs per 100 WBC 0 <1 /100    Absolute Neutrophils 3.5 1.6 - 8.3 10e3/uL    Absolute Lymphocytes 1.3 0.8 - 5.3 10e3/uL    Absolute Monocytes 0.5 0.0 - 1.3 10e3/uL    Absolute Eosinophils 0.1 0.0 - 0.7 10e3/uL    Absolute Basophils 0.0 0.0 - 0.2 10e3/uL    Absolute Immature Granulocytes 0.0 <=0.4 10e3/uL    Absolute NRBCs 0.0 10e3/uL   Extra Blue Top Tube     Status: None   Result Value Ref Range    Hold Specimen JIC    Extra Red Top Tube     Status: None   Result Value Ref Range    Hold Specimen JIC    Extra Green Top (Lithium Heparin) Tube     Status: None   Result Value Ref Range    Hold Specimen JIC    Extra Purple Top Tube     Status: None   Result Value Ref Range    Hold Specimen JIC    CBC with platelets  differential     Status: None    Narrative    The following orders were created for panel order CBC with platelets differential.  Procedure                               Abnormality         Status                     ---------                               -----------         ------                     CBC with platelets and d...[[id number]]                      Final result                 Please view results for these tests on the individual orders.     Medications   0.9% sodium chloride BOLUS (0 mLs Intravenous Stopped 3/14/22 1023)     Followed by   sodium chloride 0.9% infusion (has no administration in time range)   ondansetron (ZOFRAN) injection 4 mg (has no administration in time range)   HYDROmorphone (PF) (DILAUDID) injection 0.5 mg (has no administration in time range)   ibuprofen (ADVIL/MOTRIN) tablet 600 mg (600 mg Oral Not Given 3/14/22 0909)        Assessments & Plan (with Medical Decision Making)   Brook Davison is a 21 year old female with a past medical history significant for kidney stones who presents to the ED for evaluation of RLQ abdominal pain radiating to the mid back.     Ddx: pyelo, stone, msk flank pain, viral syndrome, gastroenteritis, colitis    Patient well-appearing on arrival.  Reports a recent short-lived viral syndrome/gastroenteritis.  Fevers have since resolved.  She did not have abdominal pain or flank pain during the febrile illness.  She has no abdominal tenderness on exam.  Specifically no Jaramillo sign or tenderness at McBurney's point.  Patient's vitals normal in the emergency department.  She does have some right CVA tenderness.  However her description of the pain is potentially musculoskeletal as it is influenced by movement and palpation.  She initially thought she had pulled a muscle in her back.  She does think she has a history of kidney stones but this was never confirmed with imaging.  Since patient has no abdominal tenderness we will proceed with a CT stone  protocol.    Urinalysis with 5 squamous epithelial cells, 3 RBCs, 1 white blood cell.  Otherwise no signs of infection.  We will add on a culture.  Patient does not have any dysuria or other urinary symptoms aside from the flank pain so I have a low suspicion for pyelonephritis.  Urine pregnancy negative.  CMP normal.  CBC normal.  CT flank shows no acute abnormalities in the abdomen or pelvis.  No renal calculi or obstructive uropathy.  Patient provided with reassurance.  I encouraged her to use topical medications for her flank pain since she does not tolerate NSAIDs.  Patient states that she has Tiger balm at home which she can try.  She says the pain is tolerable and is improving.  I encouraged her to follow-up with her primary care provider in a few days if the pain has not resolved.  Detailed return precautions provided.      I have reviewed the nursing notes. I have reviewed the findings, diagnosis, plan and need for follow up with the patient.    New Prescriptions    No medications on file       Final diagnoses:   Flank pain     I, Faby Balderrama, am serving as a trained medical scribe to document services personally performed by Janee Jacinto MD based on the provider's statements to me on March 14, 2022.  This document has been checked and approved by the attending provider.    I, Janee Jacinto MD, was physically present and have reviewed and verified the accuracy of this note documented by Faby Balderrama medical scribe.      Janee Jacinto MD      --  Janee Jacinto  [facility] EMERGENCY DEPARTMENT  3/14/2022     Janee Jacinto MD  03/14/22 1111

## 2022-03-14 NOTE — ED TRIAGE NOTES
21 yr old female ambulatory to triage presenting to ED presenting with concern for kidney stone. Last Thursday had stomach cramping, vomiting, fevers. Friday and Saturday started feeling pain radiating from right flank to RLQ abd. Hx of kidney stones. No fevers since Thursday. Fully vaccinated and boosted against COVID.

## 2022-03-14 NOTE — DISCHARGE INSTRUCTIONS
Please make an appointment to follow up with Your Primary Care Provider in 3-7 days unless symptoms completely resolve.    Return to the ED for worsening pain, recurrent vomiting or diarrhea, blood in our stool, dehydration, fever, or abdominal distention and inability to pass gas from below.

## 2022-03-16 LAB — BACTERIA UR CULT: NORMAL

## 2022-03-16 NOTE — RESULT ENCOUNTER NOTE
Final urine culture report is negative.  Adult Negative Urine culture parameters per protocol: Any # Urogenital single or mixed organism, <10,000 col/ml single organism (cath/midstream), and > 3 organisms (No susceptibilities performed).  [facility] discharge antibiotic prescribed (If applicable): None  Treatment recommendations per [facility] ED Lab Result Urine Culture protocol.

## 2022-09-18 ENCOUNTER — HEALTH MAINTENANCE LETTER (OUTPATIENT)
Age: 22
End: 2022-09-18

## 2023-01-28 ENCOUNTER — HEALTH MAINTENANCE LETTER (OUTPATIENT)
Age: 23
End: 2023-01-28

## 2024-02-25 ENCOUNTER — HEALTH MAINTENANCE LETTER (OUTPATIENT)
Age: 24
End: 2024-02-25

## 2025-03-09 ENCOUNTER — HEALTH MAINTENANCE LETTER (OUTPATIENT)
Age: 25
End: 2025-03-09